# Patient Record
(demographics unavailable — no encounter records)

---

## 2024-11-20 NOTE — HISTORY OF PRESENT ILLNESS
[FreeTextEntry8] : 24F, here for to establish care as a new patient.  pt was diagnosed with seborrheic dermatitis by her dermatologist. She reports increased stress from life.  Pt had a patch of rash in left shin that is now resolved but she has chronic hyperpigmentation there.    Pt also had bout of diffuse itching in 8/2024, but no rash. Pt only has scratch marks. Pt also reports feet tingling sensation and would like work up to check on it.  Pt is followed by GI for microscopic Crohn's disease, supposed to restart Entyvio.   [de-identified] : 24F, here for new patient physical.  rash/itching"?

## 2024-11-20 NOTE — HISTORY OF PRESENT ILLNESS
[FreeTextEntry8] : 24F, here for to establish care as a new patient.  pt was diagnosed with seborrheic dermatitis by her dermatologist. She reports increased stress from life.  Pt had a patch of rash in left shin that is now resolved but she has chronic hyperpigmentation there.    Pt also had bout of diffuse itching in 8/2024, but no rash. Pt only has scratch marks. Pt also reports feet tingling sensation and would like work up to check on it.  Pt is followed by GI for microscopic Crohn's disease, supposed to restart Entyvio.   [de-identified] : 24F, here for new patient physical.  rash/itching"?

## 2024-11-20 NOTE — PLAN
[FreeTextEntry1] : 24F here for new patient visit.  rash non active, normal feet exam.  willl check for DM, b12/folate, CBC and CMP.  pt to RTC for CPE.

## 2024-12-13 NOTE — PHYSICAL EXAM
[Appropriately responsive] : appropriately responsive [Alert] : alert [No Acute Distress] : no acute distress [Soft] : soft [Non-tender] : non-tender [Oriented x3] : oriented x3 [Labia Majora] : normal [Labia Minora] : normal [Discharge] : a  ~M vaginal discharge was present [Green] : green [Thick] : thick [Heavy] : There was heavy vaginal bleeding [Normal] : normal [Uterine Adnexae] : normal

## 2024-12-13 NOTE — HISTORY OF PRESENT ILLNESS
[FreeTextEntry1] : Patient is a 24 year old,  presenting for c/o vaginal itching and discharge X1 week.  Does have some burning when urinating but only where it is itchy. Has some irritation and change in her discharge (now yellowish green)  Had been seeing provider a [facility] but OBGYN is on maternity  LMP: 24   GYNHx: Denies abnl pap smears Denies fibroids/endometriosis/cysts Denies STIs   SexualHx: Most recent encounter was Friday   Contraception: no penetration but oral sex only   Occupation: Radiology scheduling

## 2024-12-13 NOTE — PLAN
[FreeTextEntry1] : #Vaginal Irritation/Discharge - UA/Urine Culture obtained - GC/CT testing sent - Affirm obtained and sent - STI testing offered/obtained - Discussed possible causes for yellowish-green discharge - Discussed options for prophylactic for possible STIs, patient declining at this time. - Patient to wait for results prior to abx treatment - Discussed importance of continued condom use for STI prevention/pregnancy prevention - Topical relief sent to preferred pharmacy - Will f/u once labs are resulted

## 2025-01-21 NOTE — REVIEW OF SYSTEMS
[Pelvic pain] : no pelvic pain [CVA Pain] : no CVA pain [Genital Rash/Irritation] : genital rash/irritation [Negative] : Heme/Lymph

## 2025-01-21 NOTE — PHYSICAL EXAM
[Appropriately responsive] : appropriately responsive [Alert] : alert [No Acute Distress] : no acute distress [Soft] : soft [Non-tender] : non-tender [Non-distended] : non-distended [Oriented x3] : oriented x3 [Labia Majora] : normal [Labia Minora] : normal [Discharge] : a  ~M vaginal discharge was present [Heavy] : heavy [White] : white [Thick] : thick [No Bleeding] : There was no active vaginal bleeding [Normal] : normal [Uterine Adnexae] : normal

## 2025-01-21 NOTE — HISTORY OF PRESENT ILLNESS
[FreeTextEntry1] : Patient is a 24 year old, , presenting for c/o malodorous discharge X2 weeks.  Was seen last month for a yeast infection.   LMP:  25 - did take PlanB on  s/p unprotected intercourse with known partner,   GYNHx: Denies abnl pap smears Denies fibroids/endometriosis/cysts Denies STIs   SexualHx: Most recent encounter was a few days ago, same partner,   Contraception: none

## 2025-01-21 NOTE — PLAN
[FreeTextEntry1] : #Vaginal Irritation/Discharge - UA/Urine Culture obtained - Affirm obtained and sent - UCG (-) at present - Discussed importance of continued condom use for STI prevention/pregnancy prevention - Prescription sent to preferred pharmacy as discussed - Will f/u once labs are resulted

## 2025-01-22 NOTE — HEALTH RISK ASSESSMENT
[Never] : Never [0] : 1) Little interest or pleasure doing things: Not at all (0) [1] : 2) Feeling down, depressed, or hopeless for several days (1) [PHQ-2 Negative - No further assessment needed] : PHQ-2 Negative - No further assessment needed [[id number]] : 1 [PapSmearComments] : pending gyn

## 2025-01-22 NOTE — HEALTH RISK ASSESSMENT
[Never] : Never [0] : 1) Little interest or pleasure doing things: Not at all (0) [1] : 2) Feeling down, depressed, or hopeless for several days (1) [PHQ-2 Negative - No further assessment needed] : PHQ-2 Negative - No further assessment needed [[id number]] : 1 [PapSmearComments] : pending gyn

## 2025-01-22 NOTE — HEALTH RISK ASSESSMENT
[Never] : Never [0] : 1) Little interest or pleasure doing things: Not at all (0) [1] : 2) Feeling down, depressed, or hopeless for several days (1) [PHQ-2 Negative - No further assessment needed] : PHQ-2 Negative - No further assessment needed [[id number]] : 1 [PapSmearComments] : pending gyn

## 2025-01-22 NOTE — PLAN
[FreeTextEntry1] : 24F, here for annual physical.  mild anemia and paresthesia of foot: check B12/folate, A1C, CBC, TSH, CMP pt will f/u GI for Crohn's  HCM: Routine b/w for lipid panel

## 2025-01-22 NOTE — HISTORY OF PRESENT ILLNESS
[de-identified] : 24F, here for annual physical.  Pt is seeing GI for microscopic Crohn's disease, has not started Entyvio since she has minimal symptoms (only mild mucous in BMs) Pt thinks she has BV, had work up yesterday with gynecology, pending culture results.  Pt has not done b/w with paresthesia of feet

## 2025-01-22 NOTE — HISTORY OF PRESENT ILLNESS
[de-identified] : 24F, here for annual physical.  Pt is seeing GI for microscopic Crohn's disease, has not started Entyvio since she has minimal symptoms (only mild mucous in BMs) Pt thinks she has BV, had work up yesterday with gynecology, pending culture results.  Pt has not done b/w with paresthesia of feet

## 2025-01-22 NOTE — HISTORY OF PRESENT ILLNESS
[de-identified] : 24F, here for annual physical.  Pt is seeing GI for microscopic Crohn's disease, has not started Entyvio since she has minimal symptoms (only mild mucous in BMs) Pt thinks she has BV, had work up yesterday with gynecology, pending culture results.  Pt has not done b/w with paresthesia of feet

## 2025-05-27 NOTE — HISTORY OF PRESENT ILLNESS
[FreeTextEntry1] : Patient is a 24-year-old, , presenting for c/o recurrent BV.. States symptoms have improved but she still feels like maybe it is recurrent.  Takes probiotics.  Notices odor and yellowish green discharge which may be semen but is unsure.  Was last treated 3/2025 and was recommended to take boric acid suppositories w/ intercourse and menses.  LMP:  25   GYNHx: Denies abnl pap smears Denies fibroids/endometriosis/cysts Denies STIs   SexualHx: Partner is uncircumcised; Most recent encounter was last weekend.   Contraception: none   Occupation:  - sitting a lot throughout the day.

## 2025-05-27 NOTE — REASON FOR VISIT
[Acute] : an acute visit for [Vulvar/Vaginal Complaint] : vulvar/vaginal complaint [Spouse] : spouse

## 2025-05-27 NOTE — PHYSICAL EXAM
[Appropriately responsive] : appropriately responsive [Alert] : alert [No Acute Distress] : no acute distress [Soft] : soft [Non-tender] : non-tender [Oriented x3] : oriented x3 [Labia Majora] : normal [Labia Minora] : normal [Discharge] : a  ~M vaginal discharge was present [Heavy] : heavy [White] : white [Thick] : thick [No Bleeding] : There was no active vaginal bleeding [Normal] : normal [Uterine Adnexae] : normal

## 2025-05-27 NOTE — PLAN
[FreeTextEntry1] : #Vaginal Irritation/Discharge - UA/Urine Culture obtained - In office UCG (+) at this time - Serum HCG ordered today - Affirm obtained and sent - Discussed importance of initiating PNV and avoidance of ETOH - Brief discussion regarding options for unplanned pregnancy - Dr. Dumont at bedside to discuss plan for follow up - All questions/concerns addressed - Will f/u once labs are resulted

## 2025-05-27 NOTE — COUNSELING
[Vitamins/Supplements] : vitamins/supplements [Bladder Hygiene] : bladder hygiene [Pregnancy Options] : pregnancy options

## 2025-06-18 NOTE — HISTORY OF PRESENT ILLNESS
[FreeTextEntry1] : 23yo  LMP  here to confirm preg and discuss options PMH: : depression and IBD (Crohn;'s, last exacerbation ~ 1 year ago)

## 2025-06-18 NOTE — DISCUSSION/SUMMARY
[FreeTextEntry1] : IUP on sono  discussed options see GI for check up and discuss on management in preg  Pt unsure will call or message desire act as desire preg ei no alcohol/drugs PNV if continues  30 min

## 2025-06-18 NOTE — PHYSICAL EXAM
[Chaperone Declined] : Chaperone offered however refused by patient, [Appropriately responsive] : appropriately responsive [No Acute Distress] : no acute distress [Soft] : soft [Non-tender] : non-tender [Non-distended] : non-distended [No Lesions] : no lesions [Oriented x3] : oriented x3

## 2025-07-12 NOTE — PROCEDURE
[TextEntry] : Dilation and curettage  Procedure start: 0911  Pre-operative time out performed.  Patients name, date of birth and procedure confirmed. Speculum placed, cervix cleansed with betadyne.  A single tooth tenaculum was placed on the anterior lip of the cervix.  A paracervical block was placed in the standard fashion using 15 cc of 1% lidocaine.  Under direct transabdominal ultrasound guidance, the cervix was progressively dilated to 31 mm using Whitney dilators.  A #10 curved curette was then inserted to the fundus under transabdominal ultrasound guidance and all four quadrants were systematically suction curettaged in the standard fashion. The curette was removed and a similar procedure was performed with a #8 curette until the gritty cry of the uterus was noted in all four quadrants. This confirmed to the best of the surgeon's ability that all remaining products of conception were removed from the uterine cavity. The products of conception were examined and villi/gestational sac/fetal parts] appropriate for 10 weeks gestation were visualized. All fetal parts identified. The products of conception were then sent to pathology. All instruments were removed from the vagina after hemostasis was confirmed.  A thin endometrial stripe was noted on transabdominal ultrasound in the sagittal and transverse views, confirming to the best of the surgeon's abilities that all remaining products of conception were removed.  The patient tolerated the procedure well. There was excellent hemostasis.  There were no operative complications.  Procedure stop: 0921   EBL:  50

## 2025-07-12 NOTE — PLAN
[FreeTextEntry1] : 25yo  at 10w1d (JACOB 26 by 6wk US not c/w LMP 25) presents for office D&C for unplanned, undesired pregnancy.  1. Dilation and Curettage - All available medical records and ultrasounds have been reviewed - All consents signed today - The patient does not desire medical  - reviewed patients responsibility to contact insurance company for coverage confirmation - Patient offered pamphlet for social work and psych support services, patient accepted at prior consultation. She sees a therapist for depression. - Genetics N/A - Reviewed disposal of remains, hospital vs. private burial. Patient desires routine office disposal.  2. D&C - procedure performed as above without complications  3. ID/Neuro/cervical prep - s/p misoprostol 400 mcg 3hrs prior to procedure - GC/CT neg 25 - doxycycline 200 mg administered PO, LOT# [id number], exp: Aug 2026 - Reviewed Ibuprofen 600 mg po q 6 prn  4. Labs/Blood type - CBC wnl 25 - Rh positive, Rhogam not indicated  5. Contraception - Patient counseled on all contraceptive options at prior consultation - Patient is unsure if she would like contraception, provided with information for [email or web address] at consultation - Previously counseled patient on immediate return to fertility post- and recommended condoms at minimum  6. Post-op - Post-operative follow-up phone call virtual visit to be scheduled in 2 weeks - Pre-and Post-operative instruction sheet given, reviewed bleeding and infection precautions - Provided 24-hour emergency contact phone number  Lilly Ochoa MD Fellow, Complex Family Planning

## 2025-07-12 NOTE — HISTORY OF PRESENT ILLNESS
[FreeTextEntry1] : 25yo  at 10w1d (JACOB 26 by 6wk US not c/w LMP 25) presents for office D&C for unplanned, undesired pregnancy.  Patient previously counseled during consultation visit on 25, desires to proceed with D&C. Patient took misoprostol at 0600.   Prior history: Denies vaginal bleeding or abdominal pain. Was not using any birth control when this happened. Partner is aware and supportive, present for consultation.  PMH significant for: - vasovagal syncope, dx in 2019, workup with Cardiology in 2019 showed normal TTE and ECG (sinus gracie), event monitor with rare PACs. Last episode 25, per ED note "Dr. Cronin confirms that TTE was normal. He states he wanted ANSON to r/o PFO", patient has not yet had ANSON. - asthma - Crohn's disease s/p ileocolic resection and bladder repair for enterovesical fistula in  - depression - anemia - denies thyroid disease, HTN  POB: 2019 miscarriage 2020 miscarriage  PGYN: Denies history of abnormal paps, fibroids, cysts, STIs  Works as

## 2025-07-12 NOTE — PHYSICAL EXAM
[Appropriately responsive] : appropriately responsive [Alert] : alert [Soft] : soft [Non-tender] : non-tender [Oriented x3] : oriented x3 [Labia Majora] : normal [Labia Minora] : normal [Normal] : normal [FreeTextEntry5] : normal work of breathing

## 2025-07-12 NOTE — PLAN
[FreeTextEntry1] : 23yo  at 10w1d (JACOB 26 by 6wk US not c/w LMP 25) presents for office D&C for unplanned, undesired pregnancy.  1. Dilation and Curettage - All available medical records and ultrasounds have been reviewed - All consents signed today - The patient does not desire medical  - reviewed patients responsibility to contact insurance company for coverage confirmation - Patient offered pamphlet for social work and psych support services, patient accepted at prior consultation. She sees a therapist for depression. - Genetics N/A - Reviewed disposal of remains, hospital vs. private burial. Patient desires routine office disposal.  2. D&C - procedure performed as above without complications  3. ID/Neuro/cervical prep - s/p misoprostol 400 mcg 3hrs prior to procedure - GC/CT neg 25 - doxycycline 200 mg administered PO, LOT# [id number], exp: Aug 2026 - Reviewed Ibuprofen 600 mg po q 6 prn  4. Labs/Blood type - CBC wnl 25 - Rh positive, Rhogam not indicated  5. Contraception - Patient counseled on all contraceptive options at prior consultation - Patient is unsure if she would like contraception, provided with information for [email or web address] at consultation - Previously counseled patient on immediate return to fertility post- and recommended condoms at minimum  6. Post-op - Post-operative follow-up phone call virtual visit to be scheduled in 2 weeks - Pre-and Post-operative instruction sheet given, reviewed bleeding and infection precautions - Provided 24-hour emergency contact phone number  Lilly Ochoa MD Fellow, Complex Family Planning

## 2025-07-12 NOTE — COUNSELING
[TextEntry] : Prior counseling: D+C Counseling Options for the pregnancy were discussed with the patient, including medical , and dilation and curettage (D&C) in the office under local anesthesia or in the operating room under sedation. Given the pregnancy is undesired, the patient would prefer not to continue the pregnancy. They do not desire to continue the pregnancy and are requesting a D&C in the office under sedation. They do not desire a medical .  Risks of D&C including: Infection: The patient was counseled on risk of infection and the use of prophylactic antibiotics, signs/symptoms of pre- and post-operative infection were reviewed. Hemorrhage: The patient was counseled on the risk of hemorrhage, possibly requiring blood (and/or blood products) transfusion, management including use of uterotonic medications, possible use of uterine balloon tamponade, possible use of interventional radiology uterine artery embolization (or other procedures), and possible hysterectomy. If the patient has had prior uterine surgeries, their risk of hemorrhage is increased. Trauma: The patient was counseled on the risk of trauma to the vagina, cervix, uterus, fallopian tubes and ovaries, possibly requiring laparoscopy, laparotomy, abdomino-pelvic organ repair and/or removal of affected organ(s). This includes possible hysterectomy, and these procedures were reviewed in detail. The patient was counseled on the small risk of uterine perforation (approximately 1/1,000 cases) and the risk of injury to the vagina, cervix, uterus, fallopian tubes, ovaries, rectum, bowel (small and large intestine), bladder, ureters, pelvic nerves and blood vessels. If the patient has had prior abdominopelvic surgeries, their risk of injury to abdominopelvic organs and their risk of hysterectomy is possibly increased.  The patient was also counseled on the small risk of the need for further surgery (there is a small risk of retained products of conception [this risk may be higher if uterine anomalies such as leiomyoma are present], a small risk of scar tissue inside the uterus) and the risk, as with any surgical procedure, of death.  The risk of harm to subsequent pregnancies or the ability to carry a subsequent fetus to term, and adverse psychological effects was discussed. The patient was offered emotional support resources.  The possible need for cervical ripening with misoprostol was discussed; the accompanying risks of infection, bleeding were discussed. They understand these risks and agree to D+C. The patient has underlying medical conditions that would increase their risks associated with the procedure. These conditions are: prior abdominopelvic surgery. The additional risks of the procedure are: hemorrhage, trauma.  The patient also understands it is their responsibility to bring to the attention of their physician any unusual symptoms following the procedure and to report to follow-up examinations.  They are sure of their decision and deny any coercion from family, friends or healthcare providers. The patient had the opportunity to ask questions and all questions were answered.